# Patient Record
Sex: FEMALE | URBAN - METROPOLITAN AREA
[De-identification: names, ages, dates, MRNs, and addresses within clinical notes are randomized per-mention and may not be internally consistent; named-entity substitution may affect disease eponyms.]

---

## 2020-06-11 ENCOUNTER — APPOINTMENT (OUTPATIENT)
Age: 25
Setting detail: DERMATOLOGY
End: 2020-06-11

## 2020-06-11 VITALS — HEIGHT: 67 IN | WEIGHT: 175 LBS | RESPIRATION RATE: 16 BRPM

## 2020-06-11 DIAGNOSIS — Q826 OTHER SPECIFIED ANOMALIES OF SKIN: ICD-10-CM

## 2020-06-11 DIAGNOSIS — Q828 OTHER SPECIFIED ANOMALIES OF SKIN: ICD-10-CM

## 2020-06-11 DIAGNOSIS — Q819 OTHER SPECIFIED ANOMALIES OF SKIN: ICD-10-CM

## 2020-06-11 PROBLEM — L85.8 OTHER SPECIFIED EPIDERMAL THICKENING: Status: ACTIVE | Noted: 2020-06-11

## 2020-06-11 PROCEDURE — OTHER ADDITIONAL NOTES: OTHER

## 2020-06-11 PROCEDURE — OTHER PRESCRIPTION SAMPLES PROVIDED: OTHER

## 2020-06-11 PROCEDURE — OTHER PRESCRIPTION: OTHER

## 2020-06-11 PROCEDURE — 99213 OFFICE O/P EST LOW 20 MIN: CPT

## 2020-06-11 PROCEDURE — OTHER COUNSELING: OTHER

## 2020-06-11 RX ORDER — TRIAMCINOLONE ACETONIDE 1 MG/G
0.1% CREAM TOPICAL BID
Qty: 1 | Refills: 0 | Status: ERX | COMMUNITY
Start: 2020-06-11

## 2020-06-11 ASSESSMENT — LOCATION DETAILED DESCRIPTION DERM
LOCATION DETAILED: LEFT PROXIMAL POSTERIOR UPPER ARM
LOCATION DETAILED: RIGHT PROXIMAL RADIAL DORSAL FOREARM

## 2020-06-11 ASSESSMENT — LOCATION SIMPLE DESCRIPTION DERM
LOCATION SIMPLE: RIGHT FOREARM
LOCATION SIMPLE: LEFT POSTERIOR UPPER ARM

## 2020-06-11 ASSESSMENT — LOCATION ZONE DERM: LOCATION ZONE: ARM

## 2020-06-11 NOTE — HPI: RASH
What Type Of Note Output Would You Prefer (Optional)?: Bullet Format
How Severe Is Your Rash?: moderate
Is This A New Presentation, Or A Follow-Up?: Rash
Additional History: Low Vitamin d ; rx vitamin D50,000 iu

## 2020-06-11 NOTE — PROCEDURE: ADDITIONAL NOTES
Detail Level: Simple
Additional Notes: ARIADNA instructed patient to moisturize with amlactin and cerve SA. Discussed it would be ok to get 30 minutes of sun exposure daily to get her Vitamin D higher.\\n**** avoid picking

## 2021-10-11 ENCOUNTER — APPOINTMENT (OUTPATIENT)
Dept: URBAN - METROPOLITAN AREA CLINIC 252 | Age: 26
Setting detail: DERMATOLOGY
End: 2021-10-12

## 2021-10-11 VITALS — RESPIRATION RATE: 15 BRPM | WEIGHT: 205 LBS | HEIGHT: 67 IN

## 2021-10-11 DIAGNOSIS — L98.0 PYOGENIC GRANULOMA: ICD-10-CM

## 2021-10-11 DIAGNOSIS — L01.01 NON-BULLOUS IMPETIGO: ICD-10-CM

## 2021-10-11 PROCEDURE — 11900 INJECT SKIN LESIONS </W 7: CPT

## 2021-10-11 PROCEDURE — 99213 OFFICE O/P EST LOW 20 MIN: CPT | Mod: 25

## 2021-10-11 PROCEDURE — OTHER PRESCRIPTION: OTHER

## 2021-10-11 PROCEDURE — OTHER INJECTION: OTHER

## 2021-10-11 PROCEDURE — OTHER COUNSELING: OTHER

## 2021-10-11 RX ORDER — MUPIROCIN 20 MG/G
OINTMENT TOPICAL BID
Qty: 22 | Refills: 0 | Status: ERX | COMMUNITY
Start: 2021-10-11

## 2021-10-11 ASSESSMENT — LOCATION DETAILED DESCRIPTION DERM: LOCATION DETAILED: LEFT INFERIOR CRUS OF ANTIHELIX

## 2021-10-11 ASSESSMENT — LOCATION SIMPLE DESCRIPTION DERM: LOCATION SIMPLE: LEFT EAR

## 2021-10-11 ASSESSMENT — LOCATION ZONE DERM: LOCATION ZONE: EAR

## 2021-10-11 NOTE — PROCEDURE: INJECTION
Bill J-Code: yes
Consent: The risks of the medication were reviewed with the patient.
Units: mg
Dose Administered (Numbers Only): 0
Dose Administered (Numbers Only): 2
Treatment Number: 1
Hide Second Medication?: No
Procedure Information: Please note that the numeric value listed in the Medication (1) and associated J-code units and Medication (2) and associated J-code units variables are j-code amounts and do not represent either the concentration or the total amount of the medications injected.  I strongly recommend selecting no to the Render J-code information in note question. This will allow your note to be more clear. If you are billing j-codes with your injection codes you need to document the total amount of the medication injected. This amount should match the j-code units. For example, if you are injecting Triamcinolone 40mg as an intramuscular injection you would select 40 for the dose field and mg for the units. This would allow you to document  with 4 units (40mg = 10mg x 4). The total volume is not used to calculate j-codes only the amount of the medication administered.
Post-Care Instructions: I reviewed with the patient in detail post-care instructions. Patient understands to keep the injection sites clean and call the clinic if there is any redness, swelling or pain.
Medication (1) And Associated J-Code Units: Triamcinolone acetonide, 10mg
Total Volume Injected In Cc (Will Not Affected Billing): 0.20
Detail Level: None
Route: IL

## 2021-10-11 NOTE — HPI: SKIN LESION
What Type Of Note Output Would You Prefer (Optional)?: Bullet Format
How Severe Is Your Skin Lesion?: moderate
Has Your Skin Lesion Been Treated?: not been treated
Is This A New Presentation, Or A Follow-Up?: Skin Lesion
Additional History: Piercing

## 2021-10-26 ENCOUNTER — APPOINTMENT (OUTPATIENT)
Dept: URBAN - METROPOLITAN AREA CLINIC 252 | Age: 26
Setting detail: DERMATOLOGY
End: 2021-11-01

## 2021-10-26 VITALS — RESPIRATION RATE: 16 BRPM | WEIGHT: 205 LBS | HEIGHT: 67 IN

## 2021-10-26 DIAGNOSIS — L98.0 PYOGENIC GRANULOMA: ICD-10-CM

## 2021-10-26 DIAGNOSIS — L01.01 NON-BULLOUS IMPETIGO: ICD-10-CM

## 2021-10-26 PROCEDURE — 11900 INJECT SKIN LESIONS </W 7: CPT

## 2021-10-26 PROCEDURE — OTHER COUNSELING: OTHER

## 2021-10-26 PROCEDURE — 99212 OFFICE O/P EST SF 10 MIN: CPT | Mod: 25

## 2021-10-26 PROCEDURE — OTHER INTRALESIONAL KENALOG: OTHER

## 2021-10-26 PROCEDURE — OTHER ADDITIONAL NOTES: OTHER

## 2021-10-26 ASSESSMENT — LOCATION DETAILED DESCRIPTION DERM
LOCATION DETAILED: LEFT INFERIOR CRUS OF ANTIHELIX
LOCATION DETAILED: LEFT CYMBA CONCHA

## 2021-10-26 ASSESSMENT — LOCATION SIMPLE DESCRIPTION DERM: LOCATION SIMPLE: LEFT EAR

## 2021-10-26 ASSESSMENT — LOCATION ZONE DERM: LOCATION ZONE: EAR

## 2021-10-26 NOTE — PROCEDURE: INTRALESIONAL KENALOG
Medical Necessity Clause: This procedure was medically necessary because the lesions that were treated were:
X Size Of Lesion In Cm (Optional): 0
Concentration Of Solution Injected (Mg/Ml): 20.0
Total Volume Injected (Ccs- Only Use Numbers And Decimals): .04
Include Z78.9 (Other Specified Conditions Influencing Health Status) As An Associated Diagnosis?: No
Kenalog Preparation: Kenalog
Detail Level: Detailed
Validate Note Data When Using Inventory: Yes
Consent: The risks of atrophy were reviewed with the patient.

## 2021-11-11 ENCOUNTER — APPOINTMENT (OUTPATIENT)
Dept: URBAN - METROPOLITAN AREA CLINIC 252 | Age: 26
Setting detail: DERMATOLOGY
End: 2021-11-15

## 2021-11-11 VITALS — WEIGHT: 205 LBS | HEIGHT: 67 IN | RESPIRATION RATE: 16 BRPM

## 2021-11-11 DIAGNOSIS — L91.0 HYPERTROPHIC SCAR: ICD-10-CM

## 2021-11-11 PROCEDURE — OTHER COUNSELING: OTHER

## 2021-11-11 PROCEDURE — 11900 INJECT SKIN LESIONS </W 7: CPT

## 2021-11-11 PROCEDURE — OTHER INTRALESIONAL KENALOG: OTHER

## 2021-11-11 ASSESSMENT — LOCATION DETAILED DESCRIPTION DERM: LOCATION DETAILED: LEFT INFERIOR CRUS OF ANTIHELIX

## 2021-11-11 ASSESSMENT — LOCATION ZONE DERM: LOCATION ZONE: EAR

## 2021-11-11 ASSESSMENT — LOCATION SIMPLE DESCRIPTION DERM: LOCATION SIMPLE: LEFT EAR

## 2021-11-11 NOTE — PROCEDURE: INTRALESIONAL KENALOG
Kenalog Preparation: Kenalog
Validate Note Data When Using Inventory: Yes
Consent: The risks of atrophy were reviewed with the patient.
Concentration Of Solution Injected (Mg/Ml): 20.0
X Size Of Lesion In Cm (Optional): 0
Total Volume Injected (Ccs- Only Use Numbers And Decimals): .04
Include Z78.9 (Other Specified Conditions Influencing Health Status) As An Associated Diagnosis?: No
Detail Level: Detailed
Medical Necessity Clause: This procedure was medically necessary because the lesions that were treated were:

## 2022-01-06 ENCOUNTER — APPOINTMENT (OUTPATIENT)
Dept: URBAN - METROPOLITAN AREA CLINIC 252 | Age: 27
Setting detail: DERMATOLOGY
End: 2022-01-07

## 2022-01-06 VITALS — RESPIRATION RATE: 16 BRPM | HEIGHT: 67 IN | WEIGHT: 200 LBS

## 2022-01-06 DIAGNOSIS — L91.0 HYPERTROPHIC SCAR: ICD-10-CM

## 2022-01-06 PROCEDURE — OTHER INTRALESIONAL KENALOG: OTHER

## 2022-01-06 PROCEDURE — OTHER COUNSELING: OTHER

## 2022-01-06 PROCEDURE — 11900 INJECT SKIN LESIONS </W 7: CPT

## 2022-01-06 ASSESSMENT — LOCATION SIMPLE DESCRIPTION DERM: LOCATION SIMPLE: LEFT EAR

## 2022-01-06 ASSESSMENT — LOCATION DETAILED DESCRIPTION DERM
LOCATION DETAILED: LEFT CYMBA CONCHA
LOCATION DETAILED: LEFT CRUS OF HELIX

## 2022-01-06 ASSESSMENT — LOCATION ZONE DERM: LOCATION ZONE: EAR

## 2022-01-06 NOTE — PROCEDURE: COUNSELING
Patient Specific Counseling (Will Not Stick From Patient To Patient): ** Could be a granuloma
Detail Level: Detailed

## 2022-01-06 NOTE — HPI: SKIN LESION
Is This A New Presentation, Or A Follow-Up?: Skin Lesion
Additional History: Resolved with intralesional Kenalog 20mg, was good for a few months then a few weeks ago was hit and reflared. Was painful and thrubby but now just inflamed and itch has improved.

## 2022-01-20 ENCOUNTER — APPOINTMENT (OUTPATIENT)
Dept: URBAN - METROPOLITAN AREA CLINIC 252 | Age: 27
Setting detail: DERMATOLOGY
End: 2022-01-20

## 2022-01-20 VITALS — WEIGHT: 205 LBS | HEIGHT: 67 IN | RESPIRATION RATE: 16 BRPM

## 2022-01-20 DIAGNOSIS — L91.8 OTHER HYPERTROPHIC DISORDERS OF THE SKIN: ICD-10-CM

## 2022-01-20 DIAGNOSIS — L91.0 HYPERTROPHIC SCAR: ICD-10-CM

## 2022-01-20 DIAGNOSIS — L72.8 OTHER FOLLICULAR CYSTS OF THE SKIN AND SUBCUTANEOUS TISSUE: ICD-10-CM

## 2022-01-20 PROBLEM — D48.5 NEOPLASM OF UNCERTAIN BEHAVIOR OF SKIN: Status: ACTIVE | Noted: 2022-01-20

## 2022-01-20 PROCEDURE — OTHER INTRALESIONAL KENALOG: OTHER

## 2022-01-20 PROCEDURE — OTHER SKIN TAG REMOVAL (COSMETIC): OTHER

## 2022-01-20 PROCEDURE — OTHER COUNSELING: OTHER

## 2022-01-20 PROCEDURE — 99212 OFFICE O/P EST SF 10 MIN: CPT | Mod: 25

## 2022-01-20 PROCEDURE — 11900 INJECT SKIN LESIONS </W 7: CPT

## 2022-01-20 ASSESSMENT — LOCATION ZONE DERM
LOCATION ZONE: NECK
LOCATION ZONE: NECK
LOCATION ZONE: FACE
LOCATION ZONE: EAR

## 2022-01-20 ASSESSMENT — LOCATION DETAILED DESCRIPTION DERM
LOCATION DETAILED: RIGHT INFERIOR ANTERIOR NECK
LOCATION DETAILED: RIGHT INFERIOR LATERAL NECK
LOCATION DETAILED: LEFT INFERIOR LATERAL NECK
LOCATION DETAILED: LEFT SUPERIOR LATERAL NECK
LOCATION DETAILED: RIGHT SUPERIOR ANTERIOR NECK
LOCATION DETAILED: LEFT INFERIOR CRUS OF ANTIHELIX
LOCATION DETAILED: LEFT ANTERIOR EARLOBE
LOCATION DETAILED: LEFT INFERIOR ANTERIOR NECK
LOCATION DETAILED: RIGHT LATERAL EYEBROW

## 2022-01-20 ASSESSMENT — LOCATION SIMPLE DESCRIPTION DERM
LOCATION SIMPLE: LEFT ANTERIOR NECK
LOCATION SIMPLE: LEFT EAR
LOCATION SIMPLE: RIGHT ANTERIOR NECK
LOCATION SIMPLE: LEFT ANTERIOR NECK
LOCATION SIMPLE: RIGHT EYEBROW

## 2022-01-20 NOTE — PROCEDURE: COUNSELING
----- Message from Luis Paul MD sent at 6/5/2017  1:41 PM CDT -----  Please tell patient that all labs were within normal limits- no sign of STD  
Detail Level: Simple
Incoming call from patient, conveyed MD'S message.  Patient verbalized understanding.  No further questions at this time.    
Left message for patient to call.   
Patient Specific Counseling (Will Not Stick From Patient To Patient): - Advised that skin tags are benign growths. \\n- The most common methods for treatment are snip removal and cryosurgery.\\n- More will likely develop over time.\\n- Patient requested treatment today.\\n- Advised that treatment is available but not medically necessary.\\n- Recommended treatment with snip removal and discussed cost of treatment.\\n- Patient expressed understanding.\\n\\nCost of treatment today: $150

## 2022-01-20 NOTE — PROCEDURE: SKIN TAG REMOVAL (COSMETIC)
Detail Level: Detailed
Consent: - Verbal and written consent was obtained, and risks were reviewed prior to procedure today. \\n- Risks discussed include but are not limited to bleeding, pigmentary change, infection, pain, and remote possibility of scarring. \\n- The patient understands that the procedure is cosmetic in nature and is not covered by or billable to insurance.
Anesthesia Type: 2% lidocaine with epinephrine
Price (Use Numbers Only, No Special Characters Or $): 150
Anesthesia Volume In Cc: 0.5
Removed With: scissors

## 2022-01-20 NOTE — PROCEDURE: COUNSELING
Detail Level: Detailed
Patient Specific Counseling (Will Not Stick From Patient To Patient): Discussed ILK vs. watching and monitoring. Pt elected to pursue ILK injection.
Patient Specific Counseling (Will Not Stick From Patient To Patient): - Discussed the option for treatment with intralesional injections.\\n- Advised that multiple injections are often necessary.\\n- Injections are typically repeated monthly until satisfactory.\\n- Patient expressed understanding
Detail Level: Zone

## 2022-03-17 ENCOUNTER — APPOINTMENT (OUTPATIENT)
Dept: URBAN - METROPOLITAN AREA CLINIC 252 | Age: 27
Setting detail: DERMATOLOGY
End: 2022-03-17

## 2022-03-17 VITALS — HEIGHT: 67 IN | WEIGHT: 195 LBS | RESPIRATION RATE: 15 BRPM

## 2022-03-17 DIAGNOSIS — L91.0 HYPERTROPHIC SCAR: ICD-10-CM

## 2022-03-17 DIAGNOSIS — Q828 OTHER SPECIFIED ANOMALIES OF SKIN: ICD-10-CM

## 2022-03-17 DIAGNOSIS — Q826 OTHER SPECIFIED ANOMALIES OF SKIN: ICD-10-CM

## 2022-03-17 DIAGNOSIS — Q819 OTHER SPECIFIED ANOMALIES OF SKIN: ICD-10-CM

## 2022-03-17 PROBLEM — L85.8 OTHER SPECIFIED EPIDERMAL THICKENING: Status: ACTIVE | Noted: 2022-03-17

## 2022-03-17 PROCEDURE — OTHER COUNSELING: OTHER

## 2022-03-17 PROCEDURE — 11900 INJECT SKIN LESIONS </W 7: CPT

## 2022-03-17 PROCEDURE — 99213 OFFICE O/P EST LOW 20 MIN: CPT | Mod: 25

## 2022-03-17 PROCEDURE — OTHER ADDITIONAL NOTES: OTHER

## 2022-03-17 PROCEDURE — OTHER MIPS QUALITY: OTHER

## 2022-03-17 PROCEDURE — OTHER INTRALESIONAL KENALOG: OTHER

## 2022-03-17 ASSESSMENT — LOCATION ZONE DERM
LOCATION ZONE: EAR
LOCATION ZONE: ARM
LOCATION ZONE: FACE

## 2022-03-17 ASSESSMENT — LOCATION SIMPLE DESCRIPTION DERM
LOCATION SIMPLE: RIGHT POSTERIOR UPPER ARM
LOCATION SIMPLE: LEFT EAR
LOCATION SIMPLE: LEFT CHEEK
LOCATION SIMPLE: LEFT POSTERIOR UPPER ARM

## 2022-03-17 ASSESSMENT — LOCATION DETAILED DESCRIPTION DERM
LOCATION DETAILED: LEFT CYMBA CONCHA
LOCATION DETAILED: LEFT INFERIOR CENTRAL MALAR CHEEK
LOCATION DETAILED: LEFT CRUS OF HELIX
LOCATION DETAILED: LEFT DISTAL POSTERIOR UPPER ARM
LOCATION DETAILED: RIGHT DISTAL POSTERIOR UPPER ARM

## 2022-03-17 NOTE — HPI: SKIN LESIONS
Is This A New Presentation, Or A Follow-Up?: Skin Lesions
Additional History: K40 was used to treat piercing induced suspected keloid vs foreign body granuloma from piercing. This resolved then she switched rings from titanium to white gold on Sat[place]ay 5 days ago and it is starting to recur.

## 2022-03-17 NOTE — PROCEDURE: ADDITIONAL NOTES
Additional Notes: Recommend and Provided Eucerin advanced repair samples. Briefly discussed laser treatment options at the spa for redness persisting if it does not self resolve.
Detail Level: Detailed
Additional Notes: Consider applying rubbing alcohol and vinegar 50:50 twice per day for a week. Discussed gapranuloma vs start of keloid. Patient expressed understanding. Verbal informed consent was obtained today. No written informed consent was obtained in order to curtail any risk of Covid-19 and influenza transmission.
Render Risk Assessment In Note?: no

## 2022-03-17 NOTE — PROCEDURE: COUNSELING
Patient Specific Counseling (Will Not Stick From Patient To Patient): - Discussed the option for treatment with intralesional injections.\\n- Advised that multiple injections are often necessary.\\n- Injections are typically repeated monthly until satisfactory.\\n- Patient expressed understanding
Detail Level: Detailed
Detail Level: Zone
numerical 0-10

## 2022-05-03 ENCOUNTER — APPOINTMENT (OUTPATIENT)
Dept: URBAN - METROPOLITAN AREA CLINIC 252 | Age: 27
Setting detail: DERMATOLOGY
End: 2022-05-03

## 2022-05-03 VITALS — HEIGHT: 67 IN | WEIGHT: 195 LBS

## 2022-05-03 DIAGNOSIS — L91.0 HYPERTROPHIC SCAR: ICD-10-CM

## 2022-05-03 PROCEDURE — 99213 OFFICE O/P EST LOW 20 MIN: CPT | Mod: 25

## 2022-05-03 PROCEDURE — OTHER INTRALESIONAL KENALOG: OTHER

## 2022-05-03 PROCEDURE — OTHER COUNSELING: OTHER

## 2022-05-03 PROCEDURE — OTHER PRESCRIPTION: OTHER

## 2022-05-03 RX ORDER — DOXYCYCLINE 50 MG/1
50MG CAPSULE ORAL QD
Qty: 30 | Refills: 0 | Status: ERX | COMMUNITY
Start: 2022-05-03

## 2022-05-03 ASSESSMENT — LOCATION DETAILED DESCRIPTION DERM
LOCATION DETAILED: LEFT CRUS OF HELIX
LOCATION DETAILED: LEFT ANTERIOR EARLOBE

## 2022-05-03 ASSESSMENT — LOCATION SIMPLE DESCRIPTION DERM: LOCATION SIMPLE: LEFT EAR

## 2022-05-03 ASSESSMENT — LOCATION ZONE DERM: LOCATION ZONE: EAR

## 2022-05-03 NOTE — PROCEDURE: COUNSELING
Patient Specific Counseling (Will Not Stick From Patient To Patient): - Discussed the option for treatment with intralesional injections.\\n- Advised that multiple injections are often necessary.\\n- Injections are typically repeated monthly until satisfactory.\\n- Discussed use of low dose of an antibiotic; doxycycline for 30 days.\\n- Mix Vinegar and water 1:1 and use to clean affected area twice per day for the next month.\\n- Patient expressed understanding
Detail Level: Detailed

## 2022-05-03 NOTE — HPI: SKIN LESION
Is This A New Presentation, Or A Follow-Up?: Skin Lesion
Additional History: Injected with k40 at last office visit. It resolved after March 17 and was gone within a week then recurred April 25th. Still has piercing in. Has some blood when scab coms off and clear yellow drainage.  Has not been using soap or rubbign alcohol to clear the area. Tender to press on.

## 2023-09-15 ENCOUNTER — APPOINTMENT (OUTPATIENT)
Dept: URBAN - METROPOLITAN AREA CLINIC 253 | Age: 28
Setting detail: DERMATOLOGY
End: 2023-09-15

## 2023-09-15 VITALS — HEIGHT: 67 IN | RESPIRATION RATE: 14 BRPM | WEIGHT: 180 LBS

## 2023-09-15 DIAGNOSIS — L91.8 OTHER HYPERTROPHIC DISORDERS OF THE SKIN: ICD-10-CM

## 2023-09-15 DIAGNOSIS — B07.0 PLANTAR WART: ICD-10-CM

## 2023-09-15 PROBLEM — D23.72 OTHER BENIGN NEOPLASM OF SKIN OF LEFT LOWER LIMB, INCLUDING HIP: Status: ACTIVE | Noted: 2023-09-15

## 2023-09-15 PROCEDURE — 17110 DESTRUCT B9 LESION 1-14: CPT

## 2023-09-15 PROCEDURE — 99212 OFFICE O/P EST SF 10 MIN: CPT | Mod: 25

## 2023-09-15 PROCEDURE — OTHER COUNSELING: OTHER

## 2023-09-15 PROCEDURE — OTHER ADDITIONAL NOTES: OTHER

## 2023-09-15 PROCEDURE — OTHER BENIGN DESTRUCTION: OTHER

## 2023-09-15 PROCEDURE — 11200 RMVL SKIN TAGS UP TO&INC 15: CPT | Mod: 59

## 2023-09-15 PROCEDURE — OTHER MIPS QUALITY: OTHER

## 2023-09-15 PROCEDURE — OTHER SKIN TAG REMOVAL: OTHER

## 2023-09-15 ASSESSMENT — LOCATION SIMPLE DESCRIPTION DERM
LOCATION SIMPLE: RIGHT THIGH
LOCATION SIMPLE: LEFT PLANTAR SURFACE

## 2023-09-15 ASSESSMENT — LOCATION ZONE DERM
LOCATION ZONE: LEG
LOCATION ZONE: FEET

## 2023-09-15 ASSESSMENT — LOCATION DETAILED DESCRIPTION DERM
LOCATION DETAILED: LEFT PLANTAR FOREFOOT OVERLYING 4TH METATARSAL
LOCATION DETAILED: LEFT PLANTAR FOREFOOT OVERLYING 3RD METATARSAL
LOCATION DETAILED: RIGHT ANTERIOR PROXIMAL THIGH

## 2023-09-15 NOTE — HPI: SKIN LESION
Is This A New Presentation, Or A Follow-Up?: Skin Lesions
Additional History: Warts treated on left foot. She has not used the at home treatments for warts. She states no bleeding or blistering. Skin tag removal on inner left thigh that has been present a couple months.

## 2023-09-15 NOTE — PROCEDURE: SKIN TAG REMOVAL
Add Associated Diagnoses If Applicable When Selecting Medical Necessity: Yes
Hemostasis: Drysol
Detail Level: Zone
Consent: Written consent obtained and the risks of skin tag removal was reviewed with the patient including but not limited to bleeding, pigmentary change, infection, pain, and remote possibility of scarring.
Medical Necessity Information: It is in your best interest to select a reason for this procedure from the list below. All of these items fulfill various CMS LCD requirements except the new and changing color options.
Medical Necessity Clause: This procedure was medically necessary because the lesions that were treated were:
Anesthesia Type: 1% lidocaine with epinephrine
Anesthesia Volume In Cc: 0.2
Include Z78.9 (Other Specified Conditions Influencing Health Status) As An Associated Diagnosis?: No

## 2023-09-15 NOTE — PROCEDURE: ADDITIONAL NOTES
Detail Level: Simple
Render Risk Assessment In Note?: no
Additional Notes: Use salicylic acid product on warts over night.

## 2023-09-15 NOTE — PROCEDURE: BENIGN DESTRUCTION
Render Post-Care Instructions In Note?: no
Medical Necessity Clause: This procedure was medically necessary because the lesions that were treated were:
Medical Necessity Information: It is in your best interest to select a reason for this procedure from the list below. All of these items fulfill various CMS LCD requirements except the new and changing color options.
Treatment Number (Will Not Render If 0): 0
Detail Level: Zone
Consent: The patient's consent was obtained including but not limited to risks of crusting, scabbing, blistering, scarring, darker or lighter pigmentary change, recurrence, incomplete removal and infection.
Anesthesia Volume In Cc: 0.2
Post-Care Instructions: I reviewed with the patient in detail post-care instructions. Patient is to wear sunprotection, and avoid picking at any of the treated lesions. Pt may apply Vaseline to crusted or scabbing areas.

## 2023-12-24 NOTE — PROCEDURE: ADDITIONAL NOTES
Render Risk Assessment In Note?: no
Detail Level: Simple
Additional Notes: Advised to do an additional 7 days of topical antibiotic. Advised patient to remove earring to allow for proper healing process. Patient is hesitant to remove earring at this time.
No